# Patient Record
Sex: FEMALE | Race: WHITE | NOT HISPANIC OR LATINO | Employment: FULL TIME | URBAN - METROPOLITAN AREA
[De-identification: names, ages, dates, MRNs, and addresses within clinical notes are randomized per-mention and may not be internally consistent; named-entity substitution may affect disease eponyms.]

---

## 2017-01-23 ENCOUNTER — OFFICE VISIT (OUTPATIENT)
Dept: OBSTETRICS AND GYNECOLOGY | Facility: CLINIC | Age: 57
End: 2017-01-23

## 2017-01-23 VITALS
HEIGHT: 63 IN | SYSTOLIC BLOOD PRESSURE: 118 MMHG | WEIGHT: 200 LBS | DIASTOLIC BLOOD PRESSURE: 70 MMHG | BODY MASS INDEX: 35.44 KG/M2

## 2017-01-23 DIAGNOSIS — K59.04 CHRONIC IDIOPATHIC CONSTIPATION: ICD-10-CM

## 2017-01-23 DIAGNOSIS — Z00.00 ANNUAL PHYSICAL EXAM: Primary | ICD-10-CM

## 2017-01-23 PROBLEM — E03.9 HYPOTHYROID: Status: ACTIVE | Noted: 2017-01-23

## 2017-01-23 PROBLEM — Z90.710 HISTORY OF HYSTERECTOMY: Status: ACTIVE | Noted: 2017-01-23

## 2017-01-23 PROCEDURE — 99396 PREV VISIT EST AGE 40-64: CPT | Performed by: OBSTETRICS & GYNECOLOGY

## 2017-01-23 RX ORDER — TRAMADOL HYDROCHLORIDE 50 MG/1
50 TABLET ORAL AS NEEDED
COMMUNITY
Start: 2016-11-16 | End: 2020-01-21

## 2017-01-23 RX ORDER — LUBIPROSTONE 8 UG/1
8 CAPSULE ORAL 2 TIMES DAILY WITH MEALS
Qty: 60 CAPSULE | Refills: 5 | Status: SHIPPED | OUTPATIENT
Start: 2017-01-23 | End: 2017-11-20 | Stop reason: SDUPTHER

## 2017-01-23 RX ORDER — TOPIRAMATE 50 MG/1
50 TABLET, FILM COATED ORAL 2 TIMES DAILY
COMMUNITY
Start: 2016-11-15

## 2017-01-23 RX ORDER — VALSARTAN 40 MG/1
40 TABLET ORAL DAILY
COMMUNITY
Start: 2016-11-16 | End: 2019-01-16

## 2017-01-23 RX ORDER — CARBAMAZEPINE 200 MG
200 TABLET ORAL 4 TIMES DAILY
COMMUNITY
Start: 2017-01-15

## 2017-01-23 RX ORDER — LEVOTHYROXINE SODIUM 0.12 MG/1
125 TABLET ORAL DAILY
COMMUNITY
Start: 2017-01-19 | End: 2021-02-09

## 2017-01-23 RX ORDER — OMEPRAZOLE 20 MG/1
20 CAPSULE, DELAYED RELEASE ORAL AS NEEDED
COMMUNITY
Start: 2016-12-26 | End: 2019-01-16

## 2017-01-23 RX ORDER — ATORVASTATIN CALCIUM 10 MG/1
10 TABLET, FILM COATED ORAL DAILY
COMMUNITY
Start: 2016-11-09 | End: 2019-01-16

## 2017-01-23 NOTE — PROGRESS NOTES
"       Visit Vitals   • /70   • Ht 62.5\" (158.8 cm)   • Wt 200 lb (90.7 kg)   • LMP Comment: LAVH   • BMI 36 kg/m2            Physical Exam                                      "

## 2017-01-23 NOTE — MR AVS SNAPSHOT
Katrin Uriah   1/23/2017 8:30 AM   Office Visit    Dept Phone:  [phone]   Encounter #:  [id number]    Provider:  Ned Peraza MD   [facility]                Your Full Care Plan              Today's Medication Changes          These changes are accurate as of: 1/23/17  9:44 AM.  If you have any questions, ask your nurse or doctor.               New Medication(s)Ordered:     lubiprostone 8 MCG capsule   Commonly known as:  AMITIZA   Take 1 capsule by mouth 2 (Two) Times a Day With Meals for 180 days.   Started by:  Ned Peraza MD         Medication(s)that have changed:     atorvastatin 10 MG tablet   Commonly known as:  LIPITOR   Take 10 mg by mouth Daily.   What changed:  Another medication with the same name was removed. Continue taking this medication, and follow the directions you see here.   Changed by:  Ned Peraza MD       Estradiol 0.52 MG/0.87 GM (0.06%) gel   Commonly known as:  ELESTRIN   Place 1 bottle on the skin Daily.   What changed:    - medication strength  - how much to take  - when to take this   Changed by:  Ned Peraza MD       levothyroxine 125 MCG tablet   Commonly known as:  SYNTHROID, LEVOTHROID   Take 125 mcg by mouth Daily.   What changed:  Another medication with the same name was removed. Continue taking this medication, and follow the directions you see here.   Changed by:  Ned Peraza MD       topiramate 50 MG tablet   Commonly known as:  TOPAMAX   Take 50 mg by mouth 2 (Two) Times a Day.   What changed:  Another medication with the same name was removed. Continue taking this medication, and follow the directions you see here.   Changed by:  Ned Peraza MD       valsartan 40 MG tablet   Commonly known as:  DIOVAN   Take 40 mg by mouth Daily.   What changed:  Another medication with the same name was removed. Continue taking this medication, and follow the directions you see here.   Changed by:  Ned Peraza  MD            Where to Get Your Medications      These medications were sent to [facility] [address] [place], [place] - [address] [place] [address] - [phone]  - [phone] [place] [id number]     Phone:  [phone]     Estradiol 0.52 MG/0.87 GM (0.06%) gel    lubiprostone 8 MCG capsule                  Your Updated Medication List          This list is accurate as of: 1/23/17  9:44 AM.  Always use your most recent med list.                atorvastatin 10 MG tablet   Commonly known as:  LIPITOR       CA-PLUS PO       EPINEPHRINE IJ       Estradiol 0.52 MG/0.87 GM (0.06%) gel   Commonly known as:  ELESTRIN   Place 1 bottle on the skin Daily.       levothyroxine 125 MCG tablet   Commonly known as:  SYNTHROID, LEVOTHROID       lubiprostone 8 MCG capsule   Commonly known as:  AMITIZA   Take 1 capsule by mouth 2 (Two) Times a Day With Meals for 180 days.       MULTIVITAMIN ADULT PO       omeprazole 20 MG capsule   Commonly known as:  priLOSEC       SUMAtriptan 100 MG tablet   Commonly known as:  IMITREX       TEGRETOL 200 MG tablet   Generic drug:  carBAMazepine       topiramate 50 MG tablet   Commonly known as:  TOPAMAX       traMADol 50 MG tablet   Commonly known as:  ULTRAM       valsartan 40 MG tablet   Commonly known as:  DIOVAN       VENTOLIN  (90 BASE) MCG/ACT inhaler   Generic drug:  albuterol               You Were Diagnosed With        Codes Comments    Annual physical exam    -  Primary ICD-10-CM: Z00.00  ICD-9-CM: V70.0     Chronic idiopathic constipation     ICD-10-CM: K59.04  ICD-9-CM: 564.00       Instructions     None    Patient Instructions History      Upcoming Appointments     Visit Type Date Time Department    ANNUAL 1/23/2017  8:30 AM [facility]      MyChart Signup     [facility] allows you to send messages to your doctor, view your test results, renew your prescriptions, schedule appointments, and more. To sign up, go to  [email or web address] and click on the Sign Up Now link in the New User? box. Enter your [employer] Activation Code exactly as it appears below along with the last four digits of your Social Security Number and your Date of Birth () to complete the sign-up process. If you do not sign up before the expiration date, you must request a new code.    [employer] Activation Code: [email or web address]  Expires: 2017  9:44 AM    If you have questions, you can email [email or web address] or call [phone] to talk to our [employer] staff. Remember, [employer] is NOT to be used for urgent needs. For medical emergencies, dial 911.               Other Info from Your Visit           Allergies     Erythromycin      Iodine      Keflex [Cephalexin]      Penicillins      Phenergan [Promethazine Hcl]      Sulfa Antibiotics      Tetracyclines & Related        Reason for Visit     Annual Exam           Vital Signs     Blood Pressure Height Weight Body Mass Index Smoking Status       118/70 62.5\" (158.8 cm) 200 lb (90.7 kg) 36 kg/m2 Never Smoker       Problems and Diagnoses Noted     Chronic idiopathic constipation    History of hysterectomy    Underactive thyroid    Annual physical exam    -  Primary        "

## 2017-01-30 NOTE — PROGRESS NOTES
"Subjective   Chief Complaint   Patient presents with   • Annual Exam     Katrin Kruse is a 57 y.o. year old  menopausal female presenting to be seen for her annual exam.  There has not been vaginal bleeding in the last 12 months.  Hot flashes and night sweats are not a significant problem.  She is doing well on estrogen replacement therapy status post hysterectomy  Was hospitalized in [place] for about 24-36 hours because of problems with constipation.  She only has bowel movements about once a week.  She is on some probiotics.  She apparently was tested for celiac and may have had one of the genes and was placed on a diet.  Her recent CAT scan showed no appendicitis she was given fleets etc. and this did not help so she was hospitalized overnight.  She'll use laxatives for 5 days.  Asked about Amitiza I prescribed in  and this did help.  She said someone told her to stop taking this because she didn't want her to get dependent on it.  I discussed it is better to take  medicine for chronic constipation rather than being in so much pain that had to be hospitalized.  She says this is a \"severe issue \"with her cramping and pain to the point where she can't work.  She does have occasional gas and takes Phazyme for this.  She says she had a colposcopy in  that was normal by Dr. Flores.  I suggested I think she is going back on Amitiza since his work before.  Discussed that this is a chronic medication for her chronic constipation.    SEXUAL Hx:  She is sexually active.  Vaginal dryness is not a problem.    HEALTH Hx:  She exercises regularly: no.  She wears her seat belt:yes.  She has concerns about domestic violence: no.  She has noticed changes in height: no.              Calcium intake is not adequate    The following portions of the patient's history were reviewed and updated as appropriate:problem list, current medications, allergies, past family history, past medical history, past social " "history and past surgical history.    Smoking status: Never Smoker                                                                 Smokeless status: Not on file                       Review of Systems normal bladder with chronic constipation     Objective   Visit Vitals   • /70   • Ht 62.5\" (158.8 cm)   • Wt 200 lb (90.7 kg)   • LMP Comment: LAVH   • BMI 36 kg/m2       General:  well developed; well nourished  no acute distress   Skin:  No suspicious lesions seen   Thyroid: normal to inspection and palpation   Breasts:  Examined in supine position  Symmetric without masses or skin dimpling  Nipples normal without inversion, lesions or discharge  There are no palpable axillary nodes   Abdomen: soft, non-tender; no masses  no umbilical or inginual hernias are present  no hepato-splenomegaly   Pelvis: Clinical staff was present for exam  External genitalia:  normal appearance of the external genitalia including Bartholin's and [place]'s glands.  :  urethral meatus normal; urethral hypermobility is absent.  Adnexa:  normal bimanual exam of the adnexa.  Rectal:  anus visually normal appearing.        Assessment   1. Normal gynecologic examination status post hysterectomy 2007  2. Chronic constipation who did well on Amitiza.     Plan   1. We'll continue the Elestrin  2. Restart Amitiza 8 µg twice a day and can continue on this as long as it is effective.    New Medications Ordered This Visit   Medications   • VENTOLIN  (90 BASE) MCG/ACT inhaler     Sig: Inhale 1 puff As Needed.   • atorvastatin (LIPITOR) 10 MG tablet     Sig: Take 10 mg by mouth Daily.   • TEGRETOL 200 MG tablet     Sig: Take 200 mg by mouth 4 (Four) Times a Day.   • levothyroxine (SYNTHROID, LEVOTHROID) 125 MCG tablet     Sig: Take 125 mcg by mouth Daily.   • omeprazole (priLOSEC) 20 MG capsule     Sig: Take 20 mg by mouth As Needed.   • topiramate (TOPAMAX) 50 MG tablet     Sig: Take 50 mg by mouth 2 (Two) Times a Day.   • valsartan " (DIOVAN) 40 MG tablet     Sig: Take 40 mg by mouth Daily.   • traMADol (ULTRAM) 50 MG tablet     Sig: Take 50 mg by mouth As Needed.   • Estradiol (ELESTRIN) 0.52 MG/0.87 GM (0.06%) gel     Sig: Place 1 bottle on the skin Daily.     Dispense:  1 bottle     Refill:  11   • lubiprostone (AMITIZA) 8 MCG capsule     Sig: Take 1 capsule by mouth 2 (Two) Times a Day With Meals for 180 days.     Dispense:  60 capsule     Refill:  5          This note was electronically signed.    Ned Peraza M.D.  January 30, 2017

## 2017-02-14 ENCOUNTER — TELEPHONE (OUTPATIENT)
Dept: OBSTETRICS AND GYNECOLOGY | Facility: CLINIC | Age: 57
End: 2017-02-14

## 2017-02-14 NOTE — TELEPHONE ENCOUNTER
----- Message from Kelin CLEMENTS Vikas sent at 2/14/2017  2:02 PM EST -----  Contact: [phone]  Patient wants her results --- apparently thought had a pap and is not sure what the plan is ---- told to call her back and leave name if not answers ---she doe4nt understand why she hasnt been contacted regarding results     Spoke to pt. Dr. Peraza did not a pap. She was confused because she thought after doing a pelvic she should have received a result for the pelvic exam.

## 2017-11-20 RX ORDER — LUBIPROSTONE 8 UG/1
CAPSULE, GELATIN COATED ORAL
Qty: 60 CAPSULE | Refills: 2 | Status: SHIPPED | OUTPATIENT
Start: 2017-11-20 | End: 2018-05-21 | Stop reason: SDUPTHER

## 2018-05-21 RX ORDER — LUBIPROSTONE 8 UG/1
CAPSULE, GELATIN COATED ORAL
Qty: 60 CAPSULE | Refills: 0 | Status: SHIPPED | OUTPATIENT
Start: 2018-05-21 | End: 2018-08-15 | Stop reason: SDUPTHER

## 2018-08-15 ENCOUNTER — TELEPHONE (OUTPATIENT)
Dept: OBSTETRICS AND GYNECOLOGY | Facility: CLINIC | Age: 58
End: 2018-08-15

## 2018-08-15 RX ORDER — LUBIPROSTONE 8 UG/1
8 CAPSULE ORAL 2 TIMES DAILY WITH MEALS
Qty: 60 CAPSULE | Refills: 0 | Status: SHIPPED | OUTPATIENT
Start: 2018-08-15 | End: 2018-10-16 | Stop reason: SDUPTHER

## 2018-10-03 ENCOUNTER — TELEPHONE (OUTPATIENT)
Dept: OBSTETRICS AND GYNECOLOGY | Facility: CLINIC | Age: 58
End: 2018-10-03

## 2018-10-03 RX ORDER — LUBIPROSTONE 8 UG/1
8 CAPSULE ORAL 2 TIMES DAILY WITH MEALS
Qty: 60 CAPSULE | Refills: 3 | Status: SHIPPED | OUTPATIENT
Start: 2018-10-03 | End: 2019-01-16

## 2018-10-03 NOTE — TELEPHONE ENCOUNTER
PT CALLED AND NEEDS REFILL OF AMITIZA SCHEDULED HER ANNUAL ADVISED ENOUGH UNTIL HER APPT   [facility] [place]

## 2018-10-16 ENCOUNTER — TELEPHONE (OUTPATIENT)
Dept: OBSTETRICS AND GYNECOLOGY | Facility: CLINIC | Age: 58
End: 2018-10-16

## 2018-10-17 RX ORDER — LUBIPROSTONE 8 UG/1
CAPSULE, GELATIN COATED ORAL
Qty: 60 CAPSULE | Refills: 3 | Status: SHIPPED | OUTPATIENT
Start: 2018-10-17 | End: 2019-01-16 | Stop reason: SDUPTHER

## 2019-01-16 ENCOUNTER — OFFICE VISIT (OUTPATIENT)
Dept: OBSTETRICS AND GYNECOLOGY | Facility: CLINIC | Age: 59
End: 2019-01-16

## 2019-01-16 VITALS
HEIGHT: 63 IN | WEIGHT: 189 LBS | DIASTOLIC BLOOD PRESSURE: 70 MMHG | SYSTOLIC BLOOD PRESSURE: 118 MMHG | BODY MASS INDEX: 33.49 KG/M2

## 2019-01-16 DIAGNOSIS — Z90.710 HISTORY OF HYSTERECTOMY: Primary | ICD-10-CM

## 2019-01-16 DIAGNOSIS — K59.04 CHRONIC IDIOPATHIC CONSTIPATION: ICD-10-CM

## 2019-01-16 DIAGNOSIS — Z01.419 WOMEN'S ANNUAL ROUTINE GYNECOLOGICAL EXAMINATION: ICD-10-CM

## 2019-01-16 PROBLEM — Z98.890 H/O SHOULDER SURGERY: Status: ACTIVE | Noted: 2019-01-16

## 2019-01-16 PROCEDURE — 99396 PREV VISIT EST AGE 40-64: CPT | Performed by: OBSTETRICS & GYNECOLOGY

## 2019-01-16 RX ORDER — ROSUVASTATIN CALCIUM 5 MG/1
TABLET, COATED ORAL
COMMUNITY
Start: 2018-11-17 | End: 2020-01-21

## 2019-01-16 RX ORDER — DEXLANSOPRAZOLE 60 MG/1
CAPSULE, DELAYED RELEASE ORAL
COMMUNITY
Start: 2012-12-31 | End: 2021-02-09

## 2019-01-16 RX ORDER — FLUTICASONE PROPIONATE 50 MCG
SPRAY, SUSPENSION (ML) NASAL
COMMUNITY
Start: 2018-12-16 | End: 2021-02-09

## 2019-01-16 RX ORDER — OXYCODONE HYDROCHLORIDE 5 MG/1
TABLET ORAL
COMMUNITY
Start: 2018-12-07 | End: 2020-01-21

## 2019-01-16 RX ORDER — LUBIPROSTONE 8 UG/1
8 CAPSULE ORAL 2 TIMES DAILY WITH MEALS
Qty: 180 CAPSULE | Refills: 3 | Status: SHIPPED | OUTPATIENT
Start: 2019-01-16 | End: 2020-01-21 | Stop reason: SDUPTHER

## 2019-01-16 NOTE — PROGRESS NOTES
"Subjective   Chief Complaint   Patient presents with   • Annual Exam     Katrin Kruse is a 58 y.o. year old  who is post-menopausal.  She is S/P hysterectomy presenting to be seen for her annual exam.  This past year she has not been on hormone replacement therapy.  There has not been vaginal bleeding in the last 12 months.  Menopausal symptoms are not present.  The Amitijocelin is helping with her chronic constipation.  She is up-to-date with colonoscopies at [facility].    SEXUAL Hx:  She is currently sexually active.    [place] is painful: no              She has concerns about domestic violence no    HEALTH Hx:  She exercises regularly: yes. Shoulder surgery right in Sept -  PT  She wears her seat belt: yes.  Calcium servings per day: 4  Caffeine intake:1              Alcohol:does not drink              Smoking:non-smoker      The following portions of the patient's history were reviewed and updated as appropriate:problem list, current medications, allergies, past family history, past medical history, past social history and past surgical history.    Social History    Tobacco Use      Smoking status: Never Smoker      Smokeless tobacco: Never Used    Review of Systems  Constitutional POS: nothing reported    NEG: anorexia or night sweats   Genitourinary POS: urgency    NEG: dysuria or hematuria   Gastointestinal POS: constipation (chronic)    NEG: bloating, change in bowel habits, melena or reflux symptoms   Integument POS: nothing reported    NEG: moles that are changing in size, shape, color or rashes   Breast POS: nothing reported    NEG: persistent breast lump, skin dimpling or nipple discharge        Objective   /70   Ht 159.4 cm (62.75\")   Wt 85.7 kg (189 lb)   BMI 33.75 kg/m²     General:  well developed; well nourished  no acute distress  appears stated age   Skin:  No suspicious lesions seen   Thyroid: not examined   Breasts:  Examined in supine position  Symmetric without masses or " skin dimpling  Nipples normal without inversion, lesions or discharge  There are no palpable axillary nodes   Abdomen: soft, non-tender; no masses  no umbilical or inguinal hernias are present  no hepato-splenomegaly   Pelvis: Clinical staff was present for exam  External genitalia:  normal appearance of the external genitalia including Bartholin's and [place]'s glands.  :  urethral meatus normal;  Vaginal:  atrophic mucosal changes are present;  Cervix:  absent. no masses at cuff  Uterus:  absent.  Adnexa:  non palpable bilaterally.       Lab and Imaging Review   Path report 2017 hysterectomy cervix uterus  Mammogram report       Assessment   1. Normal postmenopausal post-hysterectomy examination asymptomatic vaginal atrophy  2. We reviewed gynecologic, breast and colorectal screening protocols.  3. Chronic constipation doing well on Amitiza we'll continue this given her sample box.  4. Recent right rotator cuff surgery September 2018, still holding her arm with decreased range of motion       Plan   1.   2. The importance of keeping all planned follow-up and taking all medications as prescribed was emphasized.  3. Self breast awareness and mammogram protocols discussed.  4. Regular exercise and calcium ( ideally dietary) discussed  5. Follow up for annual exam or PRN     New Medications Ordered This Visit   Medications   • lubiprostone (AMITIZA) 8 MCG capsule     Sig: Take 1 capsule by mouth 2 (Two) Times a Day With Meals.     Dispense:  180 capsule     Refill:  3     Please consider 90 day supplies to promote better adherence          This note was electronically signed.    Ned Peraza MD  January 16, 2019    Note: Speech recognition transcription software may have been used to create portions of this document.  An attempt at proofreading has been made but errors in transcription could still be present.

## 2020-01-21 ENCOUNTER — OFFICE VISIT (OUTPATIENT)
Dept: OBSTETRICS AND GYNECOLOGY | Facility: CLINIC | Age: 60
End: 2020-01-21

## 2020-01-21 VITALS
BODY MASS INDEX: 34.55 KG/M2 | HEIGHT: 63 IN | DIASTOLIC BLOOD PRESSURE: 70 MMHG | SYSTOLIC BLOOD PRESSURE: 124 MMHG | WEIGHT: 195 LBS

## 2020-01-21 DIAGNOSIS — Z01.419 ENCOUNTER FOR GYNECOLOGICAL EXAMINATION WITHOUT ABNORMAL FINDING: Primary | ICD-10-CM

## 2020-01-21 DIAGNOSIS — K59.01 SLOW TRANSIT CONSTIPATION: ICD-10-CM

## 2020-01-21 PROBLEM — Z98.890 H/O SHOULDER SURGERY: Status: RESOLVED | Noted: 2019-01-16 | Resolved: 2020-01-21

## 2020-01-21 PROBLEM — N39.3 STRESS INCONTINENCE IN FEMALE: Status: ACTIVE | Noted: 2020-01-21

## 2020-01-21 PROCEDURE — 99396 PREV VISIT EST AGE 40-64: CPT | Performed by: OBSTETRICS & GYNECOLOGY

## 2020-01-21 RX ORDER — LUBIPROSTONE 8 UG/1
8 CAPSULE ORAL 2 TIMES DAILY WITH MEALS
Qty: 180 CAPSULE | Refills: 3 | Status: SHIPPED | OUTPATIENT
Start: 2020-01-21 | End: 2021-02-09 | Stop reason: SDUPTHER

## 2020-01-21 NOTE — PROGRESS NOTES
DataSubjective   Chief Complaint   Patient presents with   • Annual Exam     Katrin Kruse is a 59 y.o. year old  who is post-menopausal.  She is S/P hysterectomy presenting to be seen for her annual exam.  This past year she has not been on hormone replacement therapy.  There has not been vaginal bleeding in the last 12 months.  Menopausal symptoms are not present.    SEXUAL Hx:  She is currently sexually active.    [place] is painful: yes - and OTC lubricants ARE NOT effective; discussed additional vaginal estrogen              She has concerns about domestic violence no    HEALTH Hx:  Level of weekly physical activity: 2 hours  She wears her seat belt: yes.  Self breast awareness: yes  Calcium servings per day: 2  Caffeine intake:1 equivalent to a cup of coffee  Social History     Substance and Sexual Activity   Alcohol Use No                 Social History    Tobacco Use      Smoking status: Never Smoker      Smokeless tobacco: Never Used      The following portions of the patient's history were reviewed and updated as appropriate:problem list, current medications, allergies, past family history, past medical history, past social history and past surgical history    Current Outpatient Medications:   •  dexlansoprazole (DEXILANT) 60 MG capsule, Daily, Disp: , Rfl:   •  EPINEPHRINE IJ, Inject  as directed., Disp: , Rfl:   •  fluticasone (FLONASE) 50 MCG/ACT nasal spray, , Disp: , Rfl:   •  levothyroxine (SYNTHROID, LEVOTHROID) 125 MCG tablet, Take 125 mcg by mouth Daily., Disp: , Rfl:   •  lubiprostone (AMITIZA) 8 MCG capsule, Take 1 capsule by mouth 2 (Two) Times a Day With Meals., Disp: 180 capsule, Rfl: 3  •  Specialty Vitamins Products (CA-PLUS PO), Take  by mouth., Disp: , Rfl:   •  SUMAtriptan (IMITREX) 100 MG tablet, Take  by mouth Every 2 (Two) Hours As Needed for migraine., Disp: , Rfl:   •  TEGRETOL 200 MG tablet, Take 200 mg by mouth 4 (Four) Times a Day., Disp: , Rfl:   •  topiramate  "(TOPAMAX) 50 MG tablet, Take 50 mg by mouth 2 (Two) Times a Day., Disp: , Rfl:   •  [START ON 1/23/2020] Estradiol (IMVEXXY MAINTENANCE PACK) 10 MCG insert, Insert 10 mcg into the vagina 2 (Two) Times a Week., Disp: 24 each, Rfl: 3    Review of Systems  Constitutional POS: nothing reported    NEG: anorexia, chills or night sweats   Genitourinary POS: She has stress incontinence Kegel's versus fairly effective.    NEG: dysuria, frequency or hematuria   Gastointestinal POS: constipation (chronic) and Doing well on Amitiza    NEG: bloating, change in bowel habits, melena or reflux symptoms   Breast                       POS: nothing reported  NEG: persistent breast lump, skin dimpling, breast tenderness or nipple discharge                    Objective   /70   Ht 160 cm (63\")   Wt 88.5 kg (195 lb)   Breastfeeding No   BMI 34.54 kg/m²     General:  well developed; well nourished  no acute distress  appears stated age   Skin:  No suspicious lesions seen   Thyroid: not examined   Breasts:  Examined in supine position  Symmetric without masses or skin dimpling  Nipples normal without inversion, lesions or discharge  Fibrocystic changes are present both breasts without a discrete mass   Abdomen: soft, non-tender; no masses  no umbilical or inguinal hernias are present  no hepato-splenomegaly   Pelvis: Clinical staff was present for exam  External genitalia:  normal appearance of the external genitalia including Bartholin's and [place]'s glands.  :  urethral meatus normal;  Vaginal:  atrophic mucosal changes are present;  Uterus:  absent.  Adnexa:  non palpable bilaterally.  Rectal:  digital rectal exam not performed; anus visually normal appearing.   She was able to do a fairly weak Kegel upon request       Lab and Imaging Review   Pap test and PATHOLOGY    No data reviewed       Assessment   1. Normal post hysterectomy postmenopausal examination with exception of some dyspareunia associate with vaginal atrophy.  " She is interested and willing to try vaginal estrogen will try Imvexxy 10 mcg if covered by insurance  2. Gynecologic, breast and colorectal screening protocols were reviewed.  She gets [facility] mamm[facility] and colonoscopies by Dr. Juanito Flores who is now on [place] where she had her last colonoscopy about 2 years ago  3. Constipation doing well on Amitiza  4. Stress urine incontinence chronic since she was a teenager       Plan   1. I will have Tanvi send the Imvexxy prescription to [facility]  2. Kegel exercises 5 minutes daily and as needed  3. The importance of keeping all planned follow-up and taking all medications as prescribed was emphasized.  4. Self breast awareness and mammogram protocols discussed.  5. Regular exercise and calcium ( ideally dietary) discussed  6. Follow up for annual exam or PRN     New Medications Ordered This Visit   Medications   • lubiprostone (AMITIZA) 8 MCG capsule     Sig: Take 1 capsule by mouth 2 (Two) Times a Day With Meals.     Dispense:  180 capsule     Refill:  3     Please consider 90 day supplies to promote better adherence   • Estradiol (IMVEXXY MAINTENANCE PACK) 10 MCG insert     Sig: Insert 10 mcg into the vagina 2 (Two) Times a Week.     Dispense:  24 each     Refill:  3     No orders of the defined types were placed in this encounter.         This note was electronically signed.    Ned Peraza MD  January 21, 2020    Note: Speech recognition transcription software may have been used to create portions of this document.  An attempt at proofreading has been made but errors in transcription could still be present.

## 2020-01-22 ENCOUNTER — TELEPHONE (OUTPATIENT)
Dept: OBSTETRICS AND GYNECOLOGY | Facility: CLINIC | Age: 60
End: 2020-01-22

## 2020-07-07 NOTE — LETTER
"2017     Julio César Hudson DO  [address]  [address]  [facility] [address]    Patient: Katrin Kruse   YOB: 1960   Date of Visit: 2017       Dear Dr. Tristan DO:    Thank you for referring Katrin Kruse to me for evaluation. Below are the relevant portions of my assessment and plan of care.    If you have questions, please do not hesitate to call me. I look forward to following Katrin along with you.         Sincerely,        Ned Peraza MD        CC: No Recipients  Ned Peraza MD  2017  8:28 AM  Sign at close encounter  Subjective   Chief Complaint   Patient presents with   • Annual Exam     Katrin Kruse is a 57 y.o. year old  menopausal female presenting to be seen for her annual exam.  There has not been vaginal bleeding in the last 12 months.  Hot flashes and night sweats are not a significant problem.  She is doing well on estrogen replacement therapy status post hysterectomy  Was hospitalized in [place] for about 24-36 hours because of problems with constipation.  She only has bowel movements about once a week.  She is on some probiotics.  She apparently was tested for celiac and may have had one of the genes and was placed on a diet.  Her recent CAT scan showed no appendicitis she was given fleets etc. and this did not help so she was hospitalized overnight.  She'll use laxatives for 5 days.  Asked about Amitiza I prescribed in  and this did help.  She said someone told her to stop taking this because she didn't want her to get dependent on it.  I discussed it is better to take  medicine for chronic constipation rather than being in so much pain that had to be hospitalized.  She says this is a \"severe issue \"with her cramping and pain to the point where she can't work.  She does have occasional gas and takes Phazyme for this.  She says she had a colposcopy in  that was normal by Dr. Flores.  I suggested I think she is going back on Amitiza " "since his work before.  Discussed that this is a chronic medication for her chronic constipation.    SEXUAL Hx:  She is sexually active.  Vaginal dryness is not a problem.    HEALTH Hx:  She exercises regularly: no.  She wears her seat belt:yes.  She has concerns about domestic violence: no.  She has noticed changes in height: no.              Calcium intake is not adequate    The following portions of the patient's history were reviewed and updated as appropriate:problem list, current medications, allergies, past family history, past medical history, past social history and past surgical history.    Smoking status: Never Smoker                                                                 Smokeless status: Not on file                       Review of Systems normal bladder with chronic constipation     Objective   Visit Vitals   • /70   • Ht 62.5\" (158.8 cm)   • Wt 200 lb (90.7 kg)   • LMP Comment: LAVH   • BMI 36 kg/m2       General:  well developed; well nourished  no acute distress   Skin:  No suspicious lesions seen   Thyroid: normal to inspection and palpation   Breasts:  Examined in supine position  Symmetric without masses or skin dimpling  Nipples normal without inversion, lesions or discharge  There are no palpable axillary nodes   Abdomen: soft, non-tender; no masses  no umbilical or inginual hernias are present  no hepato-splenomegaly   Pelvis: Clinical staff was present for exam  External genitalia:  normal appearance of the external genitalia including Bartholin's and Wilder's glands.  :  urethral meatus normal; urethral hypermobility is absent.  Adnexa:  normal bimanual exam of the adnexa.  Rectal:  anus visually normal appearing.        Assessment   1. Normal gynecologic examination status post hysterectomy 2007  2. Chronic constipation who did well on Amitiza.     Plan   1. We'll continue the Elestrin  2. Restart Amitiza 8 µg twice a day and can continue on this as long as it is " effective.    New Medications Ordered This Visit   Medications   • VENTOLIN  (90 BASE) MCG/ACT inhaler     Sig: Inhale 1 puff As Needed.   • atorvastatin (LIPITOR) 10 MG tablet     Sig: Take 10 mg by mouth Daily.   • TEGRETOL 200 MG tablet     Sig: Take 200 mg by mouth 4 (Four) Times a Day.   • levothyroxine (SYNTHROID, LEVOTHROID) 125 MCG tablet     Sig: Take 125 mcg by mouth Daily.   • omeprazole (priLOSEC) 20 MG capsule     Sig: Take 20 mg by mouth As Needed.   • topiramate (TOPAMAX) 50 MG tablet     Sig: Take 50 mg by mouth 2 (Two) Times a Day.   • valsartan (DIOVAN) 40 MG tablet     Sig: Take 40 mg by mouth Daily.   • traMADol (ULTRAM) 50 MG tablet     Sig: Take 50 mg by mouth As Needed.   • Estradiol (ELESTRIN) 0.52 MG/0.87 GM (0.06%) gel     Sig: Place 1 bottle on the skin Daily.     Dispense:  1 bottle     Refill:  11   • lubiprostone (AMITIZA) 8 MCG capsule     Sig: Take 1 capsule by mouth 2 (Two) Times a Day With Meals for 180 days.     Dispense:  60 capsule     Refill:  5          This note was electronically signed.    Ned Peraza M.D.  January 30, 2017   Yes

## 2021-02-09 ENCOUNTER — OFFICE VISIT (OUTPATIENT)
Dept: OBSTETRICS AND GYNECOLOGY | Facility: CLINIC | Age: 61
End: 2021-02-09

## 2021-02-09 ENCOUNTER — TELEPHONE (OUTPATIENT)
Dept: OBSTETRICS AND GYNECOLOGY | Facility: CLINIC | Age: 61
End: 2021-02-09

## 2021-02-09 VITALS
DIASTOLIC BLOOD PRESSURE: 80 MMHG | BODY MASS INDEX: 29.59 KG/M2 | SYSTOLIC BLOOD PRESSURE: 124 MMHG | WEIGHT: 167 LBS | HEIGHT: 63 IN

## 2021-02-09 DIAGNOSIS — F52.0 HYPOACTIVE SEXUAL DESIRE: ICD-10-CM

## 2021-02-09 DIAGNOSIS — K59.01 SLOW TRANSIT CONSTIPATION: ICD-10-CM

## 2021-02-09 DIAGNOSIS — Z01.419 ENCOUNTER FOR GYNECOLOGICAL EXAMINATION WITHOUT ABNORMAL FINDING: Primary | ICD-10-CM

## 2021-02-09 PROBLEM — K59.04 CHRONIC IDIOPATHIC CONSTIPATION: Status: RESOLVED | Noted: 2017-01-23 | Resolved: 2021-02-09

## 2021-02-09 PROCEDURE — 99396 PREV VISIT EST AGE 40-64: CPT | Performed by: OBSTETRICS & GYNECOLOGY

## 2021-02-09 RX ORDER — ESTRADIOL 0.05 MG/D
1 PATCH TRANSDERMAL WEEKLY
Qty: 24 PATCH | Refills: 3 | Status: SHIPPED | OUTPATIENT
Start: 2021-02-09 | End: 2022-02-10

## 2021-02-09 RX ORDER — LEVOTHYROXINE SODIUM 88 UG/1
TABLET ORAL
COMMUNITY
Start: 2021-01-19

## 2021-02-09 RX ORDER — ROSUVASTATIN CALCIUM 10 MG/1
TABLET, COATED ORAL
COMMUNITY
Start: 2021-01-22

## 2021-02-09 RX ORDER — TRAMADOL HYDROCHLORIDE 50 MG/1
TABLET ORAL
COMMUNITY
Start: 2020-12-09

## 2021-02-09 RX ORDER — LUBIPROSTONE 8 UG/1
8 CAPSULE ORAL 2 TIMES DAILY WITH MEALS
Qty: 180 CAPSULE | Refills: 3 | Status: SHIPPED | OUTPATIENT
Start: 2021-02-09 | End: 2023-03-03 | Stop reason: SDUPTHER

## 2021-02-09 RX ORDER — EPINEPHRINE 0.3 MG/.3ML
INJECTION SUBCUTANEOUS
COMMUNITY
Start: 2020-12-11
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
## 2021-02-09 NOTE — TELEPHONE ENCOUNTER
I weekly is fine if that is covered by her insurance I probably gave her too many but I bet that pharmacy will correct that to 12 instead of 24 just let Katrin know she can leave it on for a week.  Thank you

## 2022-02-10 ENCOUNTER — OFFICE VISIT (OUTPATIENT)
Dept: OBSTETRICS AND GYNECOLOGY | Facility: CLINIC | Age: 62
End: 2022-02-10

## 2022-02-10 VITALS
WEIGHT: 152 LBS | DIASTOLIC BLOOD PRESSURE: 80 MMHG | RESPIRATION RATE: 16 BRPM | BODY MASS INDEX: 27.14 KG/M2 | SYSTOLIC BLOOD PRESSURE: 120 MMHG

## 2022-02-10 DIAGNOSIS — N94.10 FEMALE DYSPAREUNIA: ICD-10-CM

## 2022-02-10 DIAGNOSIS — N95.2 VAGINAL ATROPHY: ICD-10-CM

## 2022-02-10 DIAGNOSIS — Z01.411 ENCOUNTER FOR GYNECOLOGICAL EXAMINATION WITH ABNORMAL FINDING: Primary | ICD-10-CM

## 2022-02-10 PROCEDURE — 99396 PREV VISIT EST AGE 40-64: CPT | Performed by: NURSE PRACTITIONER

## 2022-02-10 RX ORDER — PLECANATIDE 3 MG/1
1 TABLET ORAL DAILY
COMMUNITY
Start: 2022-01-24

## 2022-02-10 RX ORDER — PRASTERONE 6.5 MG/1
1 INSERT VAGINAL NIGHTLY
Qty: 28 EACH | Refills: 11 | Status: SHIPPED | OUTPATIENT
Start: 2022-02-10

## 2022-02-10 NOTE — PROGRESS NOTES
Annual Visit     Patient Name: Katrin Kruse  : 1960   MRN: [id number]   Care Team: Patient Care Team:  Julio César Hudson DO as PCP - General    Chief Complaint:    Chief Complaint   Patient presents with   • Annual Exam       HPI: Katrin Kruse is a 62 y.o. year old  presenting to be seen for her gynecologic exam.   S/p total hysterctomy in 2017 d/t AUB   Ovaries remain     C/o vaginal dryness and dyspareunia   States she has used estrogen cream and it was not helpful   No bothersome hot flashes or night sweats   Decreased libido is also a problem     Mammogram 10/2021 WNL per pt - done at [facility]     Colonoscopy 2021   Sees GI - hx of IBS - C   Taking amitiza and also Trulance - she has f/u with GI in 6 wks   Mother with hx of colon cancer     Retired from [employer] after 27 yrs - fractured her spine a yr later after falling off of a porch   Chronic back pain since - has DDD and bulging discs   Lives in [place] and has a home in [place] - they just came back yesterday   Mother in law just passed away last wk   Her daughter is studying to be a NP       Subjective      /80   Resp 16   Wt 68.9 kg (152 lb)   Breastfeeding No   BMI 27.14 kg/m²     BMI reviewed: Body mass index is 27.14 kg/m².      Objective     Physical Exam    Neuro: alert and oriented to person, place and time   General:  alert; cooperative; well developed; well nourished   Skin:  No suspicious lesions seen   Thyroid: normal to inspection and palpation   Lungs:  breathing is unlabored  clear to auscultation bilaterally   Heart:  regular rate and rhythm, S1, S2 normal, no murmur, click, rub or gallop  normal apical impulse   Breasts:  Examined in supine position  Symmetric without masses or skin dimpling  Nipples normal without inversion, lesions or discharge  There are no palpable axillary nodes   Abdomen: soft, non-tender; no masses  no umbilical or inguinal hernias are present  no  hepato-splenomegaly   Pelvis: Clinical staff was present for exam  External genitalia:  normal appearance of the external genitalia including Bartholin's and Morris's glands.  :  urethral meatus normal;  Vaginal:  atrophic mucosal changes are present; atrophy with stippling noted  Cervix:  absent.  Uterus:  absent.  Adnexa:  non palpable bilaterally.  Rectal:  digital rectal exam not performed; anus visually normal appearing.         Assessment / Plan      Assessment  Problems Addressed This Visit    ICD-10-CM ICD-9-CM   1. Encounter for gynecological examination with abnormal finding  Z01.411 V72.31   2. Vaginal atrophy  N95.2 627.3   3. Female dyspareunia  N94.10 625.0       Plan    Discussed monthly SBEs and importance of annual imaging   Mammogram UTD - done at      Discussed mgmt options for atrophy and associated dyspareunia   Trial of Intrarosa - discussed once atrophy managed, libido may increase   F/u in office in 3 months   Will call with questions/concerns before then     Cont f/u IBS - C with GI     AV 1 yr             Follow Up  Return in about 3 months (around 5/10/2022) for Recheck.  Patient was given instructions and counseling regarding her condition or for health maintenance advice. Please see specific information pulled into the AVS if appropriate.     Estee Griffith, TE  February 10, 2022  14:55 EST

## 2022-05-09 ENCOUNTER — TELEPHONE (OUTPATIENT)
Dept: OBSTETRICS AND GYNECOLOGY | Facility: CLINIC | Age: 62
End: 2022-05-09

## 2022-05-09 NOTE — TELEPHONE ENCOUNTER
GONSALO CALLED HAS APPT TOMORROW SHE IS CALLING TO CANCEL ACCORDING TO HER SHE CALLED IN FEB ABOUT THE MEDS CALLED IN DUE TO EXPENSE -  SHOWS HANNAH DISCUSSED WITH DR AND PHARMACY AND PT NEVER WAS NOTIFIED FROM PHARMACY ABOUT MEDS SO SHE HAS NOT BEEN TAKING ANYTHING JUST WROTE IT UP TO NOT BEING COVERED AND NEVER HEARD FROM HER PHARMACY - SHE IS GOING TO  ANOTHER PRESCRIPTION TODAY AND SEE WHAT THEY HAVE IF ANYTHING AND LET US KNOW

## 2022-10-27 ENCOUNTER — APPOINTMENT (RX ONLY)
Dept: URBAN - METROPOLITAN AREA CLINIC 148 | Facility: CLINIC | Age: 62
Setting detail: DERMATOLOGY
End: 2022-10-27

## 2022-10-27 DIAGNOSIS — L81.4 OTHER MELANIN HYPERPIGMENTATION: ICD-10-CM

## 2022-10-27 DIAGNOSIS — L57.3 POIKILODERMA OF CIVATTE: ICD-10-CM

## 2022-10-27 DIAGNOSIS — D22 MELANOCYTIC NEVI: ICD-10-CM

## 2022-10-27 DIAGNOSIS — L82.0 INFLAMED SEBORRHEIC KERATOSIS: ICD-10-CM

## 2022-10-27 DIAGNOSIS — L82.1 OTHER SEBORRHEIC KERATOSIS: ICD-10-CM

## 2022-10-27 PROBLEM — D22.5 MELANOCYTIC NEVI OF TRUNK: Status: ACTIVE | Noted: 2022-10-27

## 2022-10-27 PROCEDURE — ? LIQUID NITROGEN

## 2022-10-27 PROCEDURE — 17110 DESTRUCTION B9 LES UP TO 14: CPT

## 2022-10-27 PROCEDURE — ? TREATMENT REGIMEN

## 2022-10-27 PROCEDURE — ? COUNSELING

## 2022-10-27 PROCEDURE — 99203 OFFICE O/P NEW LOW 30 MIN: CPT | Mod: 25

## 2022-10-27 ASSESSMENT — LOCATION ZONE DERM
LOCATION ZONE: LEG
LOCATION ZONE: FACE
LOCATION ZONE: TRUNK

## 2022-10-27 ASSESSMENT — LOCATION SIMPLE DESCRIPTION DERM
LOCATION SIMPLE: RIGHT CHEEK
LOCATION SIMPLE: RIGHT UPPER BACK
LOCATION SIMPLE: LEFT ANKLE
LOCATION SIMPLE: LEFT UPPER BACK
LOCATION SIMPLE: RIGHT ANKLE

## 2022-10-27 ASSESSMENT — LOCATION DETAILED DESCRIPTION DERM
LOCATION DETAILED: RIGHT POSTERIOR ANKLE
LOCATION DETAILED: RIGHT SUPERIOR MEDIAL UPPER BACK
LOCATION DETAILED: LEFT POSTERIOR ANKLE
LOCATION DETAILED: RIGHT CENTRAL BUCCAL CHEEK
LOCATION DETAILED: LEFT MEDIAL UPPER BACK

## 2022-10-27 NOTE — PROCEDURE: LIQUID NITROGEN
Total Number Of Lesions Treated: 2
Post-Care Instructions: I reviewed with the patient in detail post-care instructions. Patient is to wear sunprotection, and avoid picking at any of the treated lesions. Pt may apply Vaseline to crusted or scabbing areas.
Medical Necessity Information: It is in your best interest to select a reason for this procedure from the list below. All of these items fulfill various CMS LCD requirements except the new and changing color options.
Render Post Care In The Note?: yes
Duration Of Freeze Thaw-Cycle (Seconds): 0
Spray Paint Text: The liquid nitrogen was applied to the skin utilizing a spray paint frosting technique.
Render In Bullet Format When Appropriate: No
Medical Necessity Clause: This procedure was medically necessary because the lesions that were treated were:
Consent: The patient's consent was obtained including but not limited to risks of crusting, scabbing, blistering, scarring, darker or lighter pigmentary change, recurrence, incomplete removal and infection.
Detail Level: Zone

## 2023-03-03 ENCOUNTER — TELEPHONE (OUTPATIENT)
Dept: OBSTETRICS AND GYNECOLOGY | Facility: CLINIC | Age: 63
End: 2023-03-03
Payer: COMMERCIAL

## 2023-03-03 RX ORDER — LUBIPROSTONE 8 UG/1
8 CAPSULE ORAL 2 TIMES DAILY WITH MEALS
OUTPATIENT
Start: 2023-03-03

## 2023-03-03 NOTE — TELEPHONE ENCOUNTER
Rx Refill Note  No documentation of this medication being prescribed since 2018 from this office.    Estee, please advise.  Requested Prescriptions     Pending Prescriptions Disp Refills   • lubiprostone (Amitiza) 8 MCG capsule       Sig: Take 1 capsule by mouth 2 (Two) Times a Day With Meals.      Last office visit with prescribing clinician: 2/10/2022   Last telemedicine visit with prescribing clinician: 5/4/2023   Next office visit with prescribing clinician: 5/4/2023                         Would you like a call back once the refill request has been completed: [] Yes [] No    If the office needs to give you a call back, can they leave a voicemail: [] Yes [] No    Krystle Solitario MA  03/03/23, 11:43 EST

## 2023-03-03 NOTE — TELEPHONE ENCOUNTER
Caller: Katrin Kruse    Relationship: Self    Best call back number: [phone]    Requested Prescriptions:   Requested Prescriptions      No prescriptions requested or ordered in this encounter      lubiprostone (Amitiza) 8 MCG capsule [[id number]] (Order [id number]    180 QUANTITY     Pharmacy where request should be sent:    [facility] [id number] [facility] [address] [facility] [address]  [phone]    Does the patient have less than a 3 day supply:  [] Yes  [x] No    Would you like a call back once the refill request has been completed: [x] Yes [] No    If the office needs to give you a call back, can they leave a voicemail: [x] Yes [] No

## 2023-03-03 NOTE — TELEPHONE ENCOUNTER
I don't see that I have ever prescribed that for her.  I documented that she was taking that and seeing GI in my last note, so she would need to f/u with GI.     Thanks!

## 2023-05-18 ENCOUNTER — OFFICE VISIT (OUTPATIENT)
Dept: OBSTETRICS AND GYNECOLOGY | Facility: CLINIC | Age: 63
End: 2023-05-18
Payer: COMMERCIAL

## 2023-05-18 VITALS
WEIGHT: 146.6 LBS | DIASTOLIC BLOOD PRESSURE: 76 MMHG | SYSTOLIC BLOOD PRESSURE: 110 MMHG | HEIGHT: 63 IN | BODY MASS INDEX: 25.98 KG/M2

## 2023-05-18 DIAGNOSIS — Z12.31 ENCOUNTER FOR SCREENING MAMMOGRAM FOR MALIGNANT NEOPLASM OF BREAST: ICD-10-CM

## 2023-05-18 DIAGNOSIS — K58.1 IRRITABLE BOWEL SYNDROME WITH CONSTIPATION: ICD-10-CM

## 2023-05-18 DIAGNOSIS — Z01.411 ENCOUNTER FOR GYNECOLOGICAL EXAMINATION WITH ABNORMAL FINDING: Primary | ICD-10-CM

## 2023-05-18 RX ORDER — ONDANSETRON 4 MG/1
4 TABLET, ORALLY DISINTEGRATING ORAL EVERY 8 HOURS
COMMUNITY
Start: 2023-01-14

## 2023-05-18 RX ORDER — PANTOPRAZOLE SODIUM 40 MG/1
40 TABLET, DELAYED RELEASE ORAL
COMMUNITY
Start: 2023-03-03

## 2023-05-18 RX ORDER — LUBIPROSTONE 8 UG/1
1 CAPSULE ORAL DAILY
COMMUNITY
Start: 2023-04-19 | End: 2023-05-18

## 2023-05-18 RX ORDER — ERGOCALCIFEROL 1.25 MG/1
1 CAPSULE ORAL WEEKLY
COMMUNITY
Start: 2023-04-05

## 2023-05-18 RX ORDER — CYANOCOBALAMIN 1000 UG/ML
INJECTION, SOLUTION INTRAMUSCULAR; SUBCUTANEOUS
COMMUNITY
Start: 2023-04-05

## 2023-05-18 RX ORDER — PLECANATIDE 3 MG/1
1 TABLET ORAL DAILY
Qty: 30 TABLET | Refills: 12 | Status: SHIPPED | OUTPATIENT
Start: 2023-05-18

## 2023-05-18 RX ORDER — SYRINGE WITH NEEDLE, 1 ML 25GX5/8"
SYRINGE, EMPTY DISPOSABLE MISCELLANEOUS
COMMUNITY
Start: 2023-04-11

## 2023-05-18 RX ORDER — BENZONATATE 200 MG/1
200 CAPSULE ORAL 3 TIMES DAILY PRN
COMMUNITY
Start: 2023-03-22

## 2023-05-18 NOTE — PROGRESS NOTES
"Chief Complaint  Katrin Kruse is a 63 y.o.  female presenting for Annual Exam (S/P hysterectomy, please discuss pap screening with patient.)    History of Present Illness  Katrin is a very pleasant 62yo woman, , a pt of Estee Rabago and previously Dr. Srinivasan.  She has done well in the past year.  She is s/p total hyst (ovaries remain in-situ).  She does admit there is vaginal dryness/ atrophy, but she really does NOT want any hormonal treatment (even just local /vag med).  She battles chronic constipation / IBS-C.  She has failed on Amitiza.  Hasn't tried Linzess or Trulance.      The following portions of the patient's history were reviewed and updated as appropriate: allergies, current medications, past family history, past medical history, past social history, past surgical history and problem list.    Allergies   Allergen Reactions   • Cephalexin Rash and Unknown (See Comments)   • Erythromycin Rash   • Iodine Anaphylaxis   • Penicillins Anaphylaxis   • Shellfish Allergy Anaphylaxis     especially with iodine   • Sulfa Antibiotics Rash   • Tetracyclines & Related Rash   • Phenergan [Promethazine Hcl] Myalgia   • Promethazine Other (See Comments) and Unknown (See Comments)     Restlessness         Current Outpatient Medications:   •  ondansetron ODT (ZOFRAN-ODT) 4 MG disintegrating tablet, Take 1 tablet by mouth Every 8 (Eight) Hours., Disp: , Rfl:   •  B-D 3CC LUER-JOEY SYR 25GX1\" 25G X 1\" 3 ML misc, USE FOR MONTHLY VITAMIN B12 INJECTIONS., Disp: , Rfl:   •  benzonatate (TESSALON) 200 MG capsule, Take 1 capsule by mouth 3 (Three) Times a Day As Needed., Disp: , Rfl:   •  cyanocobalamin 1000 MCG/ML injection, INJECT 1 ML (CC) INTRAMUSCULARLY ONCE EVERY 30 DAYS, Disp: , Rfl:   •  EPINEPHrine (EPIPEN) 0.3 MG/0.3ML solution auto-injector injection, , Disp: , Rfl:   •  levothyroxine (SYNTHROID, LEVOTHROID) 88 MCG tablet, , Disp: , Rfl:   •  pantoprazole (PROTONIX) 40 MG EC tablet, Take 1 tablet " "by mouth Every Morning Before Breakfast., Disp: , Rfl:   •  Plecanatide (Trulance) 3 MG tablet, Take 1 tablet by mouth Daily., Disp: 30 tablet, Rfl: 12  •  rosuvastatin (CRESTOR) 10 MG tablet, , Disp: , Rfl:   •  SUMAtriptan (IMITREX) 100 MG tablet, Take  by mouth Every 2 (Two) Hours As Needed for migraine., Disp: , Rfl:   •  TEGRETOL 200 MG tablet, Take 200 mg by mouth 4 (Four) Times a Day., Disp: , Rfl:   •  topiramate (TOPAMAX) 50 MG tablet, Take 50 mg by mouth 2 (Two) Times a Day., Disp: , Rfl:   •  traMADol (ULTRAM) 50 MG tablet, , Disp: , Rfl:   •  vitamin D (ERGOCALCIFEROL) 1.25 MG (09879 UT) capsule capsule, Take 1 capsule by mouth 1 (One) Time Per Week., Disp: , Rfl:     Past Medical History:   Diagnosis Date   • Disease of thyroid gland    • Fracture of spine, cervical, without spinal cord injury, closed 2021   • History of hysterectomy 1/23/2017   • Hx of partial seizures    • Hyperlipidemia    • Hypertension         Past Surgical History:   Procedure Laterality Date   • CERVICAL CONIZATION   W/ LASER     • COLONOSCOPY  2016   • DIAGNOSTIC LAPAROSCOPY     • ENDOMETRIAL ABLATION     • KNEE SURGERY      Left   • LAPAROSCOPIC ASSISTED VAGINAL HYSTERECTOMY  07/09/2007   • ROTATOR CUFF REPAIR Right 09/2018   • TENDON REPAIR Left     Replacement Left foot   • TUBAL ABDOMINAL LIGATION     • WRIST SURGERY Right 04/23/2020       Objective  /76   Ht 159.4 cm (62.75\")   Wt 66.5 kg (146 lb 9.6 oz)   Breastfeeding No   BMI 26.18 kg/m²     Physical Exam  Vitals and nursing note reviewed. Exam conducted with a chaperone present.   Constitutional:       General: She is not in acute distress.     Appearance: Normal appearance. She is not ill-appearing.   HENT:      Head: Normocephalic.   Neck:      Thyroid: No thyroid mass or thyromegaly.   Cardiovascular:      Rate and Rhythm: Normal rate and regular rhythm.      Heart sounds: Normal heart sounds. No murmur heard.  Pulmonary:      Effort: Pulmonary effort is " normal. No respiratory distress.      Breath sounds: Normal breath sounds.   Chest:   Breasts:     Right: No inverted nipple, mass or nipple discharge.      Left: No inverted nipple, mass or nipple discharge.   Abdominal:      Palpations: Abdomen is soft. There is no mass.      Tenderness: There is no abdominal tenderness.   Genitourinary:     General: Normal vulva.      Labia:         Right: No rash, tenderness or lesion.         Left: No rash, tenderness or lesion.       Vagina: No vaginal discharge or erythema.      Uterus: Absent.       Adnexa:         Right: No mass or tenderness.          Left: No mass or tenderness.        Comments: Anus appears wnl.  No rectal exam performed.  Lymphadenopathy:      Upper Body:      Right upper body: No supraclavicular or axillary adenopathy.      Left upper body: No supraclavicular or axillary adenopathy.   Skin:     General: Skin is warm and dry.   Neurological:      Mental Status: She is alert and oriented to person, place, and time.   Psychiatric:         Mood and Affect: Mood normal.         Behavior: Behavior normal.         Assessment/Plan   Diagnoses and all orders for this visit:    1. Encounter for gynecological examination with abnormal finding (Primary)    2. Irritable bowel syndrome with constipation  -     Plecanatide (Trulance) 3 MG tablet; Take 1 tablet by mouth Daily.  Dispense: 30 tablet; Refill: 12    3. Encounter for screening mammogram for malignant neoplasm of breast  -     Mammo Screening Digital Tomosynthesis Bilateral With CAD; Future        Procedures    40 to 64: Counseling/Anticipatory Guidance Discussed: nutrition, physical activity, screenings and self-breast exam    Return in about 1 year (around 5/18/2024) for Annual physical.    Kelly Jorgensen, APRN  05/18/2023

## 2024-02-13 ENCOUNTER — OFFICE VISIT (OUTPATIENT)
Dept: OBSTETRICS AND GYNECOLOGY | Facility: CLINIC | Age: 64
End: 2024-02-13
Payer: MEDICARE

## 2024-02-13 VITALS
DIASTOLIC BLOOD PRESSURE: 88 MMHG | BODY MASS INDEX: 30.65 KG/M2 | SYSTOLIC BLOOD PRESSURE: 128 MMHG | HEIGHT: 63 IN | WEIGHT: 173 LBS

## 2024-02-13 DIAGNOSIS — N95.2 ATROPHY OF VAGINA: ICD-10-CM

## 2024-02-13 DIAGNOSIS — N81.11 MIDLINE CYSTOCELE: Primary | ICD-10-CM

## 2024-02-13 PROCEDURE — 1159F MED LIST DOCD IN RCRD: CPT | Performed by: NURSE PRACTITIONER

## 2024-02-13 PROCEDURE — 99213 OFFICE O/P EST LOW 20 MIN: CPT | Performed by: NURSE PRACTITIONER

## 2024-02-13 PROCEDURE — 1160F RVW MEDS BY RX/DR IN RCRD: CPT | Performed by: NURSE PRACTITIONER

## 2024-02-13 RX ORDER — FERROUS SULFATE 324(65)MG
324 TABLET, DELAYED RELEASE (ENTERIC COATED) ORAL
COMMUNITY

## 2024-02-13 RX ORDER — FLUTICASONE PROPIONATE 50 MCG
SPRAY, SUSPENSION (ML) NASAL
COMMUNITY

## 2024-02-13 RX ORDER — ESTRADIOL 10 UG/1
1 INSERT VAGINAL 2 TIMES WEEKLY
Qty: 24 TABLET | Refills: 3 | Status: SHIPPED | OUTPATIENT
Start: 2024-02-15

## 2024-02-13 RX ORDER — LEVOTHYROXINE SODIUM 112 UG/1
112 TABLET ORAL
COMMUNITY
Start: 2024-01-09

## 2024-02-14 ENCOUNTER — TELEPHONE (OUTPATIENT)
Dept: OBSTETRICS AND GYNECOLOGY | Facility: CLINIC | Age: 64
End: 2024-02-14
Payer: COMMERCIAL

## 2024-02-20 ENCOUNTER — TELEPHONE (OUTPATIENT)
Dept: OBSTETRICS AND GYNECOLOGY | Facility: CLINIC | Age: 64
End: 2024-02-20
Payer: COMMERCIAL

## 2024-02-20 NOTE — TELEPHONE ENCOUNTER
Called and spoke with patient.  She stated that she read on the Vagifem medication packet that if the patient has cancer in the family or a seizure disorder that they should not take Vagifem.  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 Sister had anal cancer.  She would like to inform Jennifer of this.

## 2024-02-20 NOTE — TELEPHONE ENCOUNTER
I called to follow up for Dr Laura at   they have her scheduled however she wants to talk to vee about medication that was prescribed she has questions please call her

## 2024-03-28 ENCOUNTER — OV NP (OUTPATIENT)
Dept: URBAN - METROPOLITAN AREA CLINIC 9 | Facility: CLINIC | Age: 64
End: 2024-03-28

## 2024-03-29 ENCOUNTER — OV NP (OUTPATIENT)
Dept: URBAN - METROPOLITAN AREA CLINIC 9 | Facility: CLINIC | Age: 64
End: 2024-03-29
Payer: MEDICARE

## 2024-03-29 VITALS
HEIGHT: 64 IN | WEIGHT: 188 LBS | SYSTOLIC BLOOD PRESSURE: 124 MMHG | DIASTOLIC BLOOD PRESSURE: 88 MMHG | BODY MASS INDEX: 32.1 KG/M2

## 2024-03-29 DIAGNOSIS — R10.84 GENERALIZED ABDOMINAL PAIN: ICD-10-CM

## 2024-03-29 DIAGNOSIS — K21.9 GASTROESOPHAGEAL REFLUX DISEASE, UNSPECIFIED WHETHER ESOPHAGITIS PRESENT: ICD-10-CM

## 2024-03-29 DIAGNOSIS — Z98.84 LAP-BAND SURGERY STATUS: ICD-10-CM

## 2024-03-29 DIAGNOSIS — Z80.0 FAMILY HISTORY OF COLON CANCER: ICD-10-CM

## 2024-03-29 DIAGNOSIS — K58.1 IRRITABLE BOWEL SYNDROME WITH CONSTIPATION: ICD-10-CM

## 2024-03-29 PROBLEM — 235595009: Status: ACTIVE | Noted: 2024-03-29

## 2024-03-29 PROCEDURE — 99204 OFFICE O/P NEW MOD 45 MIN: CPT | Performed by: INTERNAL MEDICINE

## 2024-03-29 RX ORDER — ROSUVASTATIN CALCIUM 10 MG/1
TABLET, FILM COATED ORAL
Qty: 90 TABLET | Status: ACTIVE | COMMUNITY

## 2024-03-29 RX ORDER — CARBAMAZEPINE 200 MG/1
1 TABLET TABLET ORAL TWICE A DAY
Qty: 60 | Status: ACTIVE | COMMUNITY
Start: 2024-03-29

## 2024-03-29 RX ORDER — PANTOPRAZOLE 40 MG/1
TABLET, DELAYED RELEASE ORAL
Qty: 90 TABLET | Status: ACTIVE | COMMUNITY

## 2024-03-29 RX ORDER — PLECANATIDE 3 MG/1
TABLET ORAL
OUTPATIENT

## 2024-03-29 RX ORDER — TOPIRAMATE 50 MG/1
1 TABLET TABLET, COATED ORAL ONCE A DAY
Qty: 30 | Status: ACTIVE | COMMUNITY
Start: 2024-03-29

## 2024-03-29 RX ORDER — LEVOTHYROXINE SODIUM 125 UG/1
TABLET ORAL
Qty: 30 TABLET | Status: ACTIVE | COMMUNITY

## 2024-03-29 RX ORDER — PLECANATIDE 3 MG/1
TABLET ORAL
Qty: 90 TABLET | Status: ACTIVE | COMMUNITY

## 2024-03-29 RX ORDER — DICYCLOMINE HYDROCHLORIDE 10 MG/1
1 CAPSULE CAPSULE ORAL
Qty: 90 | Refills: 3 | OUTPATIENT
Start: 2024-03-29 | End: 2024-07-27

## 2024-03-29 RX ORDER — WHEAT DEXTRIN 3 G/3.5 G
1 TABLESPOON POWDER (GRAM) ORAL DAILY
Qty: 30 TABLET | Refills: 3 | OUTPATIENT
Start: 2024-03-29 | End: 2024-07-27

## 2024-03-29 RX ORDER — PANTOPRAZOLE 40 MG/1
TABLET, DELAYED RELEASE ORAL
OUTPATIENT

## 2024-03-29 NOTE — HPI-TODAY'S VISIT:
Pt here for evaluation of abd pain and constipation and gerd . 10/2023 CT a/p w/o contrast neg, pt has allergy to iodine . 3/2024 CBC, BMP, lfts neg, TSH, neg, TTG/IGA neg . COlon several years ago Pt on chronic omeprazole . Pt has not tried diet changes Has known pelvic floor issues with surgery planned Pt is on fiber supplement . Pt needs an EGD for HP and duodenal bx. She needs a colon for her family history. She has had labs and imaging that was unrevealing. This may be due to her pelvic floor issues but also may be due to her type of fiber and she has not tried diet changes. Will start low fermentation diet, strat benefiber and plan on the scopes then decide next steps.  .

## 2024-04-01 ENCOUNTER — COLON (OUTPATIENT)
Dept: URBAN - METROPOLITAN AREA SURGERY CENTER 9 | Facility: SURGERY CENTER | Age: 64
End: 2024-04-01

## 2024-04-01 RX ORDER — CARBAMAZEPINE 200 MG/1
1 TABLET TABLET ORAL TWICE A DAY
Qty: 60 | Status: ACTIVE | COMMUNITY
Start: 2024-03-29

## 2024-04-01 RX ORDER — TOPIRAMATE 50 MG/1
1 TABLET TABLET, COATED ORAL ONCE A DAY
Qty: 30 | Status: ACTIVE | COMMUNITY
Start: 2024-03-29

## 2024-04-01 RX ORDER — PANTOPRAZOLE 40 MG/1
TABLET, DELAYED RELEASE ORAL
Status: ACTIVE | COMMUNITY

## 2024-04-01 RX ORDER — DICYCLOMINE HYDROCHLORIDE 10 MG/1
1 CAPSULE CAPSULE ORAL
Qty: 90 | Refills: 3 | Status: ACTIVE | COMMUNITY
Start: 2024-03-29 | End: 2024-07-27

## 2024-04-01 RX ORDER — PLECANATIDE 3 MG/1
TABLET ORAL
Status: ACTIVE | COMMUNITY

## 2024-04-01 RX ORDER — LEVOTHYROXINE SODIUM 125 UG/1
TABLET ORAL
Qty: 30 TABLET | Status: ACTIVE | COMMUNITY

## 2024-04-01 RX ORDER — WHEAT DEXTRIN 3 G/3.5 G
1 TABLESPOON POWDER (GRAM) ORAL DAILY
Qty: 30 TABLET | Refills: 3 | Status: ACTIVE | COMMUNITY
Start: 2024-03-29 | End: 2024-07-27

## 2024-04-01 RX ORDER — ROSUVASTATIN CALCIUM 10 MG/1
TABLET, FILM COATED ORAL
Qty: 90 TABLET | Status: ACTIVE | COMMUNITY

## 2024-05-08 ENCOUNTER — TELEPHONE ENCOUNTER (OUTPATIENT)
Dept: URBAN - METROPOLITAN AREA CLINIC 9 | Facility: CLINIC | Age: 64
End: 2024-05-08

## 2024-05-22 ENCOUNTER — OFFICE VISIT (OUTPATIENT)
Dept: URBAN - METROPOLITAN AREA CLINIC 9 | Facility: CLINIC | Age: 64
End: 2024-05-22

## 2024-06-10 ENCOUNTER — TELEPHONE (OUTPATIENT)
Dept: OBSTETRICS AND GYNECOLOGY | Facility: CLINIC | Age: 64
End: 2024-06-10
Payer: COMMERCIAL

## 2024-06-10 DIAGNOSIS — K58.1 IRRITABLE BOWEL SYNDROME WITH CONSTIPATION: ICD-10-CM

## 2024-06-10 RX ORDER — PLECANATIDE 3 MG/1
1 TABLET ORAL DAILY
Qty: 30 TABLET | Refills: 3 | Status: SHIPPED | OUTPATIENT
Start: 2024-06-10

## 2024-06-10 NOTE — TELEPHONE ENCOUNTER
Pt needs the following refilled -     Plecanatide (Trulance) 3 MG tablet     Please send to [facility] [place]    Thank you

## 2024-11-02 DIAGNOSIS — M25.561 ACUTE PAIN OF RIGHT KNEE: Primary | ICD-10-CM

## 2024-11-02 RX ORDER — HYDROCODONE BITARTRATE AND ACETAMINOPHEN 7.5; 325 MG/1; MG/1
1 TABLET ORAL EVERY 4 HOURS PRN
Qty: 60 TABLET | Refills: 0 | Status: SHIPPED | OUTPATIENT
Start: 2024-11-02

## 2025-01-01 DIAGNOSIS — K58.1 IRRITABLE BOWEL SYNDROME WITH CONSTIPATION: ICD-10-CM

## 2025-01-01 RX ORDER — PLECANATIDE 3 MG/1
1 TABLET ORAL DAILY
Qty: 30 TABLET | Refills: 2 | Status: SHIPPED | OUTPATIENT
Start: 2025-01-01

## 2025-01-01 NOTE — TELEPHONE ENCOUNTER
Received a refill authorization for Trulance from her pharmacy in [place].  Appears this is a [facility] patient who does not have any scheduled follow-up with another provider.  And sending in a 3-month supply of this with no refills.  That should give her sufficient time to establish care with a different gynecologist or migrate this prescription to her primary care.    New Medications Ordered This Visit   Medications    Plecanatide (Trulance) 3 MG tablet     Sig: Take 1 tablet by mouth Daily.     Dispense:  30 tablet     Refill:  2

## 2025-04-04 DIAGNOSIS — K58.1 IRRITABLE BOWEL SYNDROME WITH CONSTIPATION: ICD-10-CM

## 2025-04-04 RX ORDER — PLECANATIDE 3 MG/1
1 TABLET ORAL DAILY
Qty: 30 TABLET | Refills: 0 | OUTPATIENT
Start: 2025-04-04

## 2025-04-04 NOTE — TELEPHONE ENCOUNTER
January there was similar request which was dispensed for a 3-month supply.  This is Jennifer Jorgensen's patient.  In January we told her she needed to either migrate this prescription to her primary care or her new gynecologist upon Jennifer's skilled nursing.  It does not appear any such appointment with our practice has been made and for that reason I will not refill this.

## 2025-04-07 NOTE — TELEPHONE ENCOUNTER
Pt informed and stated understanding.  Please call and schedule pt for appt as recommended. She will not be home from [place] until the end of May. She was looking for an appointment with TE Brito, as that is who she was previously scheduled to see but went to [place] at that time so she was unable to make the appointment.

## 2025-05-30 ENCOUNTER — OFFICE VISIT (OUTPATIENT)
Dept: OBSTETRICS AND GYNECOLOGY | Facility: CLINIC | Age: 65
End: 2025-05-30
Payer: MEDICARE

## 2025-05-30 VITALS
BODY MASS INDEX: 36.96 KG/M2 | DIASTOLIC BLOOD PRESSURE: 80 MMHG | SYSTOLIC BLOOD PRESSURE: 128 MMHG | WEIGHT: 207 LBS | RESPIRATION RATE: 16 BRPM

## 2025-05-30 DIAGNOSIS — N81.6 CYSTOCELE WITH RECTOCELE: ICD-10-CM

## 2025-05-30 DIAGNOSIS — N81.10 CYSTOCELE WITH RECTOCELE: ICD-10-CM

## 2025-05-30 DIAGNOSIS — Z01.419 WELL WOMAN EXAM WITH ROUTINE GYNECOLOGICAL EXAM: Primary | ICD-10-CM

## 2025-05-30 RX ORDER — ALBUTEROL SULFATE 90 UG/1
2 INHALANT RESPIRATORY (INHALATION)
COMMUNITY
Start: 2024-08-22

## 2025-05-30 RX ORDER — LEVOTHYROXINE SODIUM 125 UG/1
125 TABLET ORAL DAILY
COMMUNITY
Start: 2025-03-12

## 2025-05-30 RX ORDER — MONTELUKAST SODIUM 10 MG/1
10 TABLET ORAL
COMMUNITY
Start: 2025-04-07

## 2025-05-30 RX ORDER — FAMOTIDINE 20 MG/1
1 TABLET, FILM COATED ORAL EVERY 12 HOURS SCHEDULED
COMMUNITY
Start: 2025-04-07

## 2025-05-30 RX ORDER — UBROGEPANT 100 MG/1
100 TABLET ORAL DAILY PRN
COMMUNITY

## 2025-05-30 RX ORDER — CETIRIZINE HYDROCHLORIDE 10 MG/1
20 TABLET ORAL
COMMUNITY
Start: 2024-12-29

## 2025-05-30 RX ORDER — SEMAGLUTIDE 0.25 MG/.5ML
INJECTION, SOLUTION SUBCUTANEOUS
COMMUNITY
Start: 2025-02-22

## 2025-05-30 RX ORDER — ONDANSETRON 4 MG/1
TABLET, FILM COATED ORAL
COMMUNITY

## 2025-05-30 NOTE — PROGRESS NOTES
Subjective   Chief Complaint   Patient presents with    Annual Exam     Katrin Kruse is a 65 y.o. year old  menopausal female presenting to be seen for her annual exam.  This past year she has not been on hormone replacement therapy.  There has not been vaginal bleeding in the last 12 months.  Menopausal symptoms are not present. She had been scheduled to have surgery for cystocele/rectocele repair but had to schedule due to Covid, and then had a knee replacement- she reports worsening symptoms and would like to get back in with Dr Floyd. It appears through [facility] that an order was placed for case request yesterday.     SEXUAL Hx:  She is not currently sexually active.  In the past year there she has not been sexually active.  She has been avoiding intercourse because of the cystocele and rectocele.   Condoms are not needed because she is not sexually active.  She would not like to be screened for STD's at today's exam.  [place] is painful: n/a but she does endorse vaginal dryness. Discussed vaginal estradiol, but she is concerned with what she has read regarding side effects from estradiol. Discussed the difference between systemic and local estradiol and efficacy- encouraged her to discuss this with Dr Floyd as well when she sees him.   HEALTH Hx:  She exercises regularly: yes. She is active with walking, swimming.   She wears her seat belt: yes.  She has concerns about domestic violence: no.  She has noticed changes in height: yes.  OTHER THINGS SHE WANTS TO DISCUSS TODAY:  Nothing else    The following portions of the patient's history were reviewed and updated as appropriate:problem list, current medications, allergies, past family history, past medical history, past social history, and past surgical history.    Social History    Tobacco Use      Smoking status: Never      Smokeless tobacco: Never    Past Medical History:   Diagnosis Date    Anemia     Disease of thyroid gland     Fracture of  spine, cervical, without spinal cord injury, closed 2021    History of hysterectomy 01/23/2017    Hx of partial seizures     Hyperlipidemia     Hypertension      Past Surgical History:   Procedure Laterality Date    CERVICAL CONIZATION   W/ LASER      COLONOSCOPY  2016    DIAGNOSTIC LAPAROSCOPY      ENDOMETRIAL ABLATION      KNEE SURGERY      Left 2011 right 11/24    LAPAROSCOPIC ASSISTED VAGINAL HYSTERECTOMY  07/09/2007    ROTATOR CUFF REPAIR Right 09/2018    TENDON REPAIR Left     Replacement Left foot    TOTAL SHOULDER REPLACEMENT Right     TUBAL ABDOMINAL LIGATION      WRIST SURGERY Right 04/23/2020         Review of Systems   Constitutional: Negative.  Negative for appetite change and diaphoresis.   Respiratory: Negative.     Cardiovascular: Negative.    Gastrointestinal: Negative.  Negative for abdominal distention, blood in stool, GERD and indigestion.   Endocrine: Negative.    Genitourinary:  Positive for pelvic pressure and urinary incontinence. Negative for breast discharge, breast lump, breast pain, dysuria and hematuria.   Skin: Negative.           Objective   /80   Resp 16   Wt 93.9 kg (207 lb)   BMI 36.96 kg/m²     Physical Exam  Vitals and nursing note reviewed. Exam conducted with a chaperone present.   Constitutional:       Appearance: Normal appearance.   Cardiovascular:      Rate and Rhythm: Normal rate and regular rhythm.      Heart sounds: Normal heart sounds.   Pulmonary:      Effort: Pulmonary effort is normal.      Breath sounds: Normal breath sounds.   Chest:   Breasts:     Right: Normal.      Left: Normal.   Genitourinary:     General: Normal vulva.      Exam position: Lithotomy position.      Vagina: Prolapsed vaginal walls present.      Uterus: Absent.       Adnexa: Right adnexa normal and left adnexa normal.      Rectum: Normal.      Comments: Cystocele and rectocele do not seem to have progressed from last assessment  Digital rectal exam not done but appears normal  visually  Neurological:      Mental Status: She is alert and oriented to person, place, and time.   Psychiatric:         Mood and Affect: Mood normal.         Behavior: Behavior normal.         Thought Content: Thought content normal.         Judgment: Judgment normal.            Diagnoses and all orders for this visit:    1. Well woman exam with routine gynecological exam (Primary)    2. Cystocele with rectocele    Other orders  -     Cancel: Ambulatory Referral to Gynecologic Urology    Pap was not done today.  I explained to Katrin that the Pap smears are no longer recommended in patients after hysterectomy.   I stressed to Katrin that she still should be seen to be seen yearly for a full physical including breast and pelvic exam.      She is up to date on all relevant gynecologic and colorectal screenings    The importance of keeping all planned follow-up and taking all medications as prescribed was emphasized.    Today I discussed with Katrin the total recommended calcium intake for a post-menopausal female is 1200 mg.  Ideally this should be from dietary sources.  I reviewed calcium content in various foods including milk, fortified orange juice and yogurt.  If she cannot get sufficient calcium through dietary means, it is recommended to supplement with either a multivitamin or calcium to reach her daily goal.  I also reviewed the difference in the bioavailability of calcium carbonate and calcium citrate containing supplements and the importance of taking calcium carbonate containing products with food. Finally, vitamin D's role in calcium absorption was reviewed and a total daily vitamin D intake of 600 units was recommended.    Her vaccine record was reviewed and updated.    I discussed with Katrin that she may be behind on needed vaccinations for vaccines are up to date.  She may be able to obtain these vaccinations at her local pharmacy OR speak about obtaining them with her primary care.  If she does  obtain her vaccines, I have asked Katrin to let us know the date each vaccine was obtained so that her medical record could be updated in our system.    No orders of the defined types were placed in this encounter.           Return in about 1 year (around 5/30/2026) for Annual physical.    Jenni Christine, APRN  May 30, 2025

## 2025-08-12 ENCOUNTER — LAB (OUTPATIENT)
Facility: HOSPITAL | Age: 65
End: 2025-08-12
Payer: COMMERCIAL

## 2025-08-12 ENCOUNTER — OFFICE VISIT (OUTPATIENT)
Age: 65
End: 2025-08-12
Payer: MEDICARE

## 2025-08-12 VITALS
WEIGHT: 196 LBS | SYSTOLIC BLOOD PRESSURE: 144 MMHG | BODY MASS INDEX: 33.46 KG/M2 | HEART RATE: 65 BPM | DIASTOLIC BLOOD PRESSURE: 88 MMHG | HEIGHT: 64 IN

## 2025-08-12 DIAGNOSIS — I42.8 NON-ISCHEMIC CARDIOMYOPATHY: Primary | ICD-10-CM

## 2025-08-12 DIAGNOSIS — E78.5 HYPERLIPIDEMIA LDL GOAL <55: ICD-10-CM

## 2025-08-12 DIAGNOSIS — I34.0 MODERATE MITRAL REGURGITATION: ICD-10-CM

## 2025-08-12 DIAGNOSIS — I87.2 CHRONIC VENOUS INSUFFICIENCY OF LOWER EXTREMITY: ICD-10-CM

## 2025-08-12 DIAGNOSIS — I10 HYPERTENSION, ESSENTIAL: ICD-10-CM

## 2025-08-12 DIAGNOSIS — R00.2 PALPITATIONS: ICD-10-CM

## 2025-08-12 PROCEDURE — 80076 HEPATIC FUNCTION PANEL: CPT

## 2025-08-12 PROCEDURE — 36415 COLL VENOUS BLD VENIPUNCTURE: CPT

## 2025-08-12 PROCEDURE — 83695 ASSAY OF LIPOPROTEIN(A): CPT

## 2025-08-12 PROCEDURE — 86141 C-REACTIVE PROTEIN HS: CPT

## 2025-08-12 PROCEDURE — 80061 LIPID PANEL: CPT

## 2025-08-12 RX ORDER — TOPIRAMATE 25 MG/1
25 TABLET, FILM COATED ORAL 2 TIMES DAILY
COMMUNITY
Start: 2025-07-22

## 2025-08-12 RX ORDER — PANTOPRAZOLE SODIUM 40 MG/1
40 TABLET, DELAYED RELEASE ORAL
COMMUNITY
Start: 2025-07-04

## 2025-08-13 LAB
ALBUMIN SERPL-MCNC: 4.1 G/DL (ref 3.5–5.2)
ALP SERPL-CCNC: 335 U/L (ref 39–117)
ALT SERPL W P-5'-P-CCNC: 14 U/L (ref 1–33)
AST SERPL-CCNC: 23 U/L (ref 1–32)
BILIRUB CONJ SERPL-MCNC: <0.1 MG/DL (ref 0–0.3)
BILIRUB INDIRECT SERPL-MCNC: ABNORMAL MG/DL
BILIRUB SERPL-MCNC: <0.2 MG/DL (ref 0–1.2)
CHOLEST SERPL-MCNC: 249 MG/DL (ref 0–200)
CRP SERPL-MCNC: 0.47 MG/DL (ref 0.01–0.5)
HDLC SERPL-MCNC: 54 MG/DL (ref 40–60)
LDLC SERPL CALC-MCNC: 177 MG/DL (ref 0–100)
LDLC/HDLC SERPL: 3.24 {RATIO}
PROT SERPL-MCNC: 7 G/DL (ref 6–8.5)
TRIGL SERPL-MCNC: 101 MG/DL (ref 0–150)
VLDLC SERPL-MCNC: 18 MG/DL (ref 5–40)

## 2025-08-14 LAB — LPA SERPL-SCNC: 64.4 NMOL/L

## 2025-08-15 RX ORDER — BEMPEDOIC ACID AND EZETIMIBE 180; 10 MG/1; MG/1
1 TABLET, FILM COATED ORAL DAILY
Qty: 90 TABLET | Refills: 1 | Status: SHIPPED | OUTPATIENT
Start: 2025-08-15

## 2025-08-21 ENCOUNTER — TELEPHONE (OUTPATIENT)
Age: 65
End: 2025-08-21
Payer: COMMERCIAL

## 2025-08-21 RX ORDER — EZETIMIBE 10 MG/1
10 TABLET ORAL DAILY
Qty: 90 TABLET | Refills: 1 | Status: SHIPPED | OUTPATIENT
Start: 2025-08-21